# Patient Record
Sex: MALE | Race: WHITE | NOT HISPANIC OR LATINO | URBAN - METROPOLITAN AREA
[De-identification: names, ages, dates, MRNs, and addresses within clinical notes are randomized per-mention and may not be internally consistent; named-entity substitution may affect disease eponyms.]

---

## 2018-08-13 ENCOUNTER — OFFICE VISIT (OUTPATIENT)
Dept: URBAN - METROPOLITAN AREA CLINIC 23 | Facility: CLINIC | Age: 48
End: 2018-08-13
Payer: COMMERCIAL

## 2018-08-13 DIAGNOSIS — H31.091 OTHER CHORIORETINAL SCARS, RIGHT EYE: ICD-10-CM

## 2018-08-13 DIAGNOSIS — H43.391 OTHER VITREOUS OPACITIES, RIGHT EYE: Primary | ICD-10-CM

## 2018-08-13 PROCEDURE — 92014 COMPRE OPH EXAM EST PT 1/>: CPT | Performed by: OPTOMETRIST

## 2018-08-13 ASSESSMENT — INTRAOCULAR PRESSURE
OD: 19
OS: 18

## 2018-08-13 ASSESSMENT — KERATOMETRY
OD: 46.13
OS: 46.25

## 2018-08-13 NOTE — IMPRESSION/PLAN
Impression: Other vitreous opacities, right eye: H43.391. Plan: Discussed diagnosis in detail with patient. Advised patient of condition. No treatment is required at this time. Discussed signs and symptoms of PVD/floaters. Discussed signs and symptoms of retinal detachment. Reassured patient of current condition and treatment. Patient instructed to call if condition gets worse. Will continue to observe.

## 2018-08-13 NOTE — IMPRESSION/PLAN
Impression: Other chorioretinal scars, right eye: H31.091. Plan: Discussed diagnosis in detail with patient. No treatment is required at this time. Reassured patient of current condition and treatment. Will continue to observe condition and or symptoms.